# Patient Record
Sex: MALE | Race: ASIAN | NOT HISPANIC OR LATINO | URBAN - METROPOLITAN AREA
[De-identification: names, ages, dates, MRNs, and addresses within clinical notes are randomized per-mention and may not be internally consistent; named-entity substitution may affect disease eponyms.]

---

## 2019-03-01 ENCOUNTER — EMERGENCY (EMERGENCY)
Facility: HOSPITAL | Age: 39
LOS: 1 days | Discharge: HOME | End: 2019-03-01
Attending: EMERGENCY MEDICINE
Payer: SELF-PAY

## 2019-03-01 VITALS
HEIGHT: 62 IN | HEART RATE: 86 BPM | TEMPERATURE: 98 F | RESPIRATION RATE: 18 BRPM | SYSTOLIC BLOOD PRESSURE: 105 MMHG | OXYGEN SATURATION: 99 % | WEIGHT: 139.99 LBS | DIASTOLIC BLOOD PRESSURE: 78 MMHG

## 2019-03-01 VITALS
OXYGEN SATURATION: 99 % | RESPIRATION RATE: 17 BRPM | HEART RATE: 82 BPM | SYSTOLIC BLOOD PRESSURE: 110 MMHG | DIASTOLIC BLOOD PRESSURE: 80 MMHG

## 2019-03-01 PROCEDURE — 99283 EMERGENCY DEPT VISIT LOW MDM: CPT | Mod: 25

## 2019-03-01 PROCEDURE — 71046 X-RAY EXAM CHEST 2 VIEWS: CPT

## 2019-03-01 PROCEDURE — 99283 EMERGENCY DEPT VISIT LOW MDM: CPT

## 2019-03-01 PROCEDURE — 71046 X-RAY EXAM CHEST 2 VIEWS: CPT | Mod: 26

## 2019-03-01 PROCEDURE — 99053 MED SERV 10PM-8AM 24 HR FAC: CPT

## 2019-03-01 RX ORDER — AZITHROMYCIN 500 MG/1
1 TABLET, FILM COATED ORAL
Qty: 6 | Refills: 0 | OUTPATIENT
Start: 2019-03-01 | End: 2019-03-05

## 2019-03-01 NOTE — ED ADULT TRIAGE NOTE - CHIEF COMPLAINT QUOTE
Pt c/o cough w/ clear sputum production.  [facility], pt came back Wednesday.

## 2019-03-01 NOTE — ED ADULT NURSE NOTE - OBJECTIVE STATEMENT
39 yr old male arrived to the ED from home c/o cough. per pt he has had a clear cough and has recently traveled to [facility]. pt was seem there for a cough and had a chest xray done. upon assessment pt is A&ox4, ambulatory with a steady gait, lungs clear bianka, mildly productive cough noted. abd is soft, non tender. skin WDL.

## 2019-03-01 NOTE — ED PROVIDER NOTE - ATTENDING CONTRIBUTION TO CARE
Afebrile. Awake and Alert. Lungs CTA. Heart RRR. Abdomen soft NTND. CN II-XII grossly intact. Moves all extremities without lateralization.    r/o PNR: No infiltrate on CXR  r/o Bronchitis: will tx w/ course of z-pack given protracted duration of sx  f/u PCP

## 2019-03-01 NOTE — ED PROVIDER NOTE - OBJECTIVE STATEMENT
39M p/w a cough for 1 week. He first developed the cough while in [place], associated w/ generalized weakness and fatigue. Treated with a course of abx at a hospital in [facility], then [place] the [place] four [place] ago. Presents today due to persistence of symptoms but no change in severity. No fever/chills/weight loss/night sweats/abd pain/n/v/d not applicable

## 2019-03-01 NOTE — ED ADULT NURSE NOTE - NSIMPLEMENTINTERV_GEN_ALL_ED
Implemented All Universal Safety Interventions:  [place] to call system. Call bell, personal items and telephone within reach. Instruct patient to call for assistance. Room bathroom lighting operational. Non-slip footwear when patient is off stretcher. Physically safe environment: no spills, clutter or unnecessary equipment. Stretcher in lowest position, wheels locked, appropriate side rails in place.

## 2019-03-01 NOTE — ED PROVIDER NOTE - CLINICAL SUMMARY MEDICAL DECISION MAKING FREE TEXT BOX
Jonathan Weil, PGY2 - viral vs bacterial pna, will obtain cxr, consider empiric treatment given duration and symptom persistent.

## 2019-03-01 NOTE — ED ADULT NURSE NOTE - CHIEF COMPLAINT QUOTE
Pt c/o cough w/ clear sputum production.  Recent travel to [facility], pt came back Wednesday.

## 2021-08-31 NOTE — ED ADULT TRIAGE NOTE - NS ED NOTE AC HIGH RISK COUNTRIES
Other Countries Prednisone Pregnancy And Lactation Text: This medication is Pregnancy Category C and it isn't know if it is safe during pregnancy. This medication is excreted in breast milk.